# Patient Record
Sex: FEMALE | Race: WHITE | Employment: UNEMPLOYED | URBAN - METROPOLITAN AREA
[De-identification: names, ages, dates, MRNs, and addresses within clinical notes are randomized per-mention and may not be internally consistent; named-entity substitution may affect disease eponyms.]

---

## 2018-12-25 ENCOUNTER — APPOINTMENT (OUTPATIENT)
Dept: CT IMAGING | Age: 73
End: 2018-12-25
Payer: MEDICARE

## 2018-12-25 ENCOUNTER — APPOINTMENT (OUTPATIENT)
Dept: GENERAL RADIOLOGY | Age: 73
End: 2018-12-25
Payer: MEDICARE

## 2018-12-25 ENCOUNTER — HOSPITAL ENCOUNTER (EMERGENCY)
Age: 73
Discharge: HOME | End: 2018-12-25
Payer: MEDICARE

## 2018-12-25 VITALS
TEMPERATURE: 98.2 F | BODY MASS INDEX: 20.16 KG/M2 | HEART RATE: 83 BPM | OXYGEN SATURATION: 94 % | DIASTOLIC BLOOD PRESSURE: 45 MMHG | RESPIRATION RATE: 15 BRPM | HEIGHT: 59 IN | SYSTOLIC BLOOD PRESSURE: 135 MMHG | WEIGHT: 100 LBS

## 2018-12-25 DIAGNOSIS — F03.90 DEMENTIA WITHOUT BEHAVIORAL DISTURBANCE, UNSPECIFIED DEMENTIA TYPE: ICD-10-CM

## 2018-12-25 DIAGNOSIS — E86.0 DEHYDRATION: ICD-10-CM

## 2018-12-25 DIAGNOSIS — N39.0 URINARY TRACT INFECTION WITHOUT HEMATURIA, SITE UNSPECIFIED: Primary | ICD-10-CM

## 2018-12-25 LAB
ALBUMIN SERPL-MCNC: 4 GM/DL (ref 3.4–5)
ALP BLD-CCNC: 106 IU/L (ref 40–129)
ALT SERPL-CCNC: 19 U/L (ref 10–40)
ANION GAP SERPL CALCULATED.3IONS-SCNC: 15 MMOL/L (ref 4–16)
AST SERPL-CCNC: 17 IU/L (ref 15–37)
BACTERIA: ABNORMAL /HPF
BASOPHILS ABSOLUTE: 0.1 K/CU MM
BASOPHILS RELATIVE PERCENT: 0.7 % (ref 0–1)
BILIRUB SERPL-MCNC: 0.4 MG/DL (ref 0–1)
BILIRUBIN URINE: NEGATIVE MG/DL
BLOOD, URINE: NEGATIVE
BUN BLDV-MCNC: 16 MG/DL (ref 6–23)
CALCIUM SERPL-MCNC: 9.2 MG/DL (ref 8.3–10.6)
CHLORIDE BLD-SCNC: 103 MMOL/L (ref 99–110)
CLARITY: ABNORMAL
CO2: 23 MMOL/L (ref 21–32)
COLOR: ABNORMAL
CREAT SERPL-MCNC: 0.9 MG/DL (ref 0.6–1.1)
DIFFERENTIAL TYPE: ABNORMAL
EOSINOPHILS ABSOLUTE: 0.2 K/CU MM
EOSINOPHILS RELATIVE PERCENT: 1.2 % (ref 0–3)
GFR AFRICAN AMERICAN: >60 ML/MIN/1.73M2
GFR NON-AFRICAN AMERICAN: >60 ML/MIN/1.73M2
GLUCOSE BLD-MCNC: 137 MG/DL (ref 70–99)
GLUCOSE, URINE: NEGATIVE MG/DL
HCT VFR BLD CALC: 47.6 % (ref 37–47)
HEMOGLOBIN: 14.7 GM/DL (ref 12.5–16)
IMMATURE NEUTROPHIL %: 0.4 % (ref 0–0.43)
KETONES, URINE: ABNORMAL MG/DL
LACTATE: 1.6 MMOL/L (ref 0.4–2)
LEUKOCYTE ESTERASE, URINE: ABNORMAL
LIPASE: 61 IU/L (ref 13–60)
LYMPHOCYTES ABSOLUTE: 3.1 K/CU MM
LYMPHOCYTES RELATIVE PERCENT: 22.7 % (ref 24–44)
MCH RBC QN AUTO: 28.7 PG (ref 27–31)
MCHC RBC AUTO-ENTMCNC: 30.9 % (ref 32–36)
MCV RBC AUTO: 93 FL (ref 78–100)
MONOCYTES ABSOLUTE: 1.2 K/CU MM
MONOCYTES RELATIVE PERCENT: 8.9 % (ref 0–4)
MUCUS: ABNORMAL HPF
NITRITE URINE, QUANTITATIVE: NEGATIVE
NUCLEATED RBC %: 0 %
PDW BLD-RTO: 13.7 % (ref 11.7–14.9)
PH, URINE: 5 (ref 5–8)
PLATELET # BLD: 393 K/CU MM (ref 140–440)
PMV BLD AUTO: 10 FL (ref 7.5–11.1)
POTASSIUM SERPL-SCNC: 4.6 MMOL/L (ref 3.5–5.1)
PROTEIN UA: 30 MG/DL
RBC # BLD: 5.12 M/CU MM (ref 4.2–5.4)
RBC URINE: 8 /HPF (ref 0–6)
SEGMENTED NEUTROPHILS ABSOLUTE COUNT: 9 K/CU MM
SEGMENTED NEUTROPHILS RELATIVE PERCENT: 66.1 % (ref 36–66)
SODIUM BLD-SCNC: 141 MMOL/L (ref 135–145)
SPECIFIC GRAVITY UA: 1.02 (ref 1–1.03)
SQUAMOUS EPITHELIAL: 8 /HPF
TOTAL IMMATURE NEUTOROPHIL: 0.06 K/CU MM
TOTAL NUCLEATED RBC: 0 K/CU MM
TOTAL PROTEIN: 7.4 GM/DL (ref 6.4–8.2)
TRANSITIONAL EPITHELIAL: <1 /HPF
TRICHOMONAS: ABNORMAL /HPF
TROPONIN T: <0.01 NG/ML
UROBILINOGEN, URINE: NORMAL MG/DL (ref 0.2–1)
WBC # BLD: 13.5 K/CU MM (ref 4–10.5)
WBC UA: 28 /HPF (ref 0–5)

## 2018-12-25 PROCEDURE — 87077 CULTURE AEROBIC IDENTIFY: CPT

## 2018-12-25 PROCEDURE — 87086 URINE CULTURE/COLONY COUNT: CPT

## 2018-12-25 PROCEDURE — 83690 ASSAY OF LIPASE: CPT

## 2018-12-25 PROCEDURE — 70450 CT HEAD/BRAIN W/O DYE: CPT

## 2018-12-25 PROCEDURE — 6360000004 HC RX CONTRAST MEDICATION: Performed by: PHYSICIAN ASSISTANT

## 2018-12-25 PROCEDURE — 85025 COMPLETE CBC W/AUTO DIFF WBC: CPT

## 2018-12-25 PROCEDURE — 71260 CT THORAX DX C+: CPT

## 2018-12-25 PROCEDURE — 2580000003 HC RX 258: Performed by: PHYSICIAN ASSISTANT

## 2018-12-25 PROCEDURE — 93005 ELECTROCARDIOGRAM TRACING: CPT | Performed by: PHYSICIAN ASSISTANT

## 2018-12-25 PROCEDURE — 74177 CT ABD & PELVIS W/CONTRAST: CPT

## 2018-12-25 PROCEDURE — 36415 COLL VENOUS BLD VENIPUNCTURE: CPT

## 2018-12-25 PROCEDURE — 6370000000 HC RX 637 (ALT 250 FOR IP): Performed by: PHYSICIAN ASSISTANT

## 2018-12-25 PROCEDURE — 81001 URINALYSIS AUTO W/SCOPE: CPT

## 2018-12-25 PROCEDURE — 96361 HYDRATE IV INFUSION ADD-ON: CPT

## 2018-12-25 PROCEDURE — 87186 SC STD MICRODIL/AGAR DIL: CPT

## 2018-12-25 PROCEDURE — 96360 HYDRATION IV INFUSION INIT: CPT

## 2018-12-25 PROCEDURE — 99284 EMERGENCY DEPT VISIT MOD MDM: CPT

## 2018-12-25 PROCEDURE — 80053 COMPREHEN METABOLIC PANEL: CPT

## 2018-12-25 PROCEDURE — 84484 ASSAY OF TROPONIN QUANT: CPT

## 2018-12-25 PROCEDURE — 71045 X-RAY EXAM CHEST 1 VIEW: CPT

## 2018-12-25 PROCEDURE — 83605 ASSAY OF LACTIC ACID: CPT

## 2018-12-25 RX ORDER — CEPHALEXIN 500 MG/1
500 CAPSULE ORAL 2 TIMES DAILY
Qty: 14 CAPSULE | Refills: 0 | Status: SHIPPED | OUTPATIENT
Start: 2018-12-25 | End: 2019-01-01

## 2018-12-25 RX ORDER — HYDROXYZINE HYDROCHLORIDE 25 MG/1
12.5 TABLET, FILM COATED ORAL NIGHTLY
COMMUNITY

## 2018-12-25 RX ORDER — ASPIRIN 81 MG/1
81 TABLET, CHEWABLE ORAL DAILY
COMMUNITY

## 2018-12-25 RX ORDER — ATORVASTATIN CALCIUM 10 MG/1
10 TABLET, FILM COATED ORAL NIGHTLY
COMMUNITY

## 2018-12-25 RX ORDER — SODIUM CHLORIDE 9 MG/ML
INJECTION, SOLUTION INTRAVENOUS CONTINUOUS
Status: DISCONTINUED | OUTPATIENT
Start: 2018-12-25 | End: 2018-12-25 | Stop reason: HOSPADM

## 2018-12-25 RX ORDER — CEPHALEXIN 250 MG/1
500 CAPSULE ORAL ONCE
Status: COMPLETED | OUTPATIENT
Start: 2018-12-25 | End: 2018-12-25

## 2018-12-25 RX ORDER — 0.9 % SODIUM CHLORIDE 0.9 %
1000 INTRAVENOUS SOLUTION INTRAVENOUS ONCE
Status: COMPLETED | OUTPATIENT
Start: 2018-12-25 | End: 2018-12-25

## 2018-12-25 RX ORDER — FLUTICASONE PROPIONATE 50 MCG
1 SPRAY, SUSPENSION (ML) NASAL NIGHTLY
COMMUNITY

## 2018-12-25 RX ORDER — QUETIAPINE FUMARATE 25 MG/1
25 TABLET, FILM COATED ORAL 2 TIMES DAILY
COMMUNITY

## 2018-12-25 RX ORDER — ACETAMINOPHEN 325 MG/1
650 TABLET ORAL
COMMUNITY

## 2018-12-25 RX ORDER — SERTRALINE HYDROCHLORIDE 25 MG/1
25 TABLET, FILM COATED ORAL DAILY
COMMUNITY

## 2018-12-25 RX ADMIN — CEPHALEXIN 500 MG: 250 CAPSULE ORAL at 18:51

## 2018-12-25 RX ADMIN — IOPAMIDOL 75 ML: 755 INJECTION, SOLUTION INTRAVENOUS at 15:39

## 2018-12-25 RX ADMIN — SODIUM CHLORIDE 1000 ML: 9 INJECTION, SOLUTION INTRAVENOUS at 15:37

## 2018-12-25 RX ADMIN — SODIUM CHLORIDE: 9 INJECTION, SOLUTION INTRAVENOUS at 17:49

## 2018-12-25 NOTE — ED PROVIDER NOTES
overlying the right heart border which may reflect eventration of the right hemidiaphragm. Further evaluation with PA and lateral chest is recommended. Density overlying the right heart border uncertain etiology. This may reflect eventration of the right hemidiaphragm. PA and lateral chest is recommended for complete assessment. Alternatively unenhanced chest CT could be pursued for further evaluation. The chest is otherwise unremarkable. ED COURSE & MEDICAL DECISION MAKING     Patient presents as above. Emergent etiologies were considered. Patient's vital signs are stable. She is known from nursing home with chief complaint of decreased activity level, lethargy, fatigue and a new onset, not repetitive, fasciculations versus tremor. Daughter is at bedside stating that she has just not been acting herself and her energy level has decreased. Denies recent fall, injuries. Has not been noticed to be sick by nursing home staff. Does have mild to moderate Alzheimer's dementia. Does not usually eat or drink much but they do have noticed that it is decreased than her normal.  On evaluation she appears well, she does grimace to pain on palpation to the right side of the abdomen. No smoking CVA tenderness. Her heart and lung sounds are within normal limits. We will obtain EKG, chest x-ray, cardiac enzymes. We'll also send another basic labs, we'll obtain a CT of her head and abdomen. We will initiate IV fluid for fluid resuscitation. Patient's white blood count that she'll mild leukocytosis of 13.5 with a left shift. There is no evidence of anemia. No thrombocytopenia.,  Has a metabolic panel was within normal limits. Troponin nondetectable. Lipase mildly elevated at 61. Initial chest x-ray did show a potential artifact, recommending CT scan of the chest.  We will scan chest, abdomen and head.   These imaging studies came back acutely negative, there was no acute cardiopulmonary process identified, signs of infection, increased vascular congestion. No other intra-abdominal process identified. Urine did show a mild urinary tract infection, we did send a urine culture, will treat with Keflex. I think the patient may be mildly dehydrated with a urinary tract infection and may call her decrease in activity. Patient did receive a bolus of fluid and then a fluid infusion throughout her emergency department visit. At this time we can discharge her back to her nursing facility, place her on antibiotics, encourage follow-up with primary care provider. Family members are in agreement with this plan. Diagnosis and plan discussed in detail with patient who understands and agrees. Patient agrees to return emergency department if symptoms worsen or any new symptoms develop. Vital signs and nursing notes reviewed during ED course. Clinical  IMPRESSION    1. Urinary tract infection without hematuria, site unspecified    2. Dehydration    3. Dementia without behavioral disturbance, unspecified dementia type      Comment: Please note this report has been produced using speech recognition software and may contain errors related to that system including errors in grammar, punctuation, and spelling, as well as words and phrases that may be inappropriate. If there are any questions or concerns please feel free to contact the dictating provider for clarification.         Iris Busch, PA  12/25/18 [id number]

## 2018-12-25 NOTE — ED NOTES
Bed: ED-26  Expected date:   Expected time:   Means of arrival:   Comments:  Medic 4 Altered mental status     Lonnie Kahn RN  12/25/18 [id number]

## 2018-12-25 NOTE — ED NOTES
Artemus, lactic acid,ammonia,type & screen,venous ph,type & screen and first set of blood cultures were drawn on patient     Second set of blood cultures at patients bedside and will need drawn via IV access      Amanda Aldrich  12/25/18 [id number]

## 2018-12-25 NOTE — ED PROVIDER NOTES
EKG:  Sinus rhythm with a rate of 81. MI interval 144, QRS 70, . No ST elevations or depressions. Normal T waves. Q waves in the septal leads. Impression: Abnormal EKG. No previous EKGs for comparison.       Ronney Holter, MD  12/25/18 7506

## 2018-12-26 PROCEDURE — 93010 ELECTROCARDIOGRAM REPORT: CPT | Performed by: INTERNAL MEDICINE

## 2018-12-28 LAB
CULTURE: NORMAL
EKG ATRIAL RATE: 81 BPM
EKG DIAGNOSIS: NORMAL
EKG P AXIS: 49 DEGREES
EKG P-R INTERVAL: 144 MS
EKG Q-T INTERVAL: 412 MS
EKG QRS DURATION: 70 MS
EKG QTC CALCULATION (BAZETT): 478 MS
EKG R AXIS: -4 DEGREES
EKG T AXIS: 60 DEGREES
EKG VENTRICULAR RATE: 81 BPM
Lab: NORMAL
ORGANISM: NORMAL
REPORT STATUS: NORMAL
SPECIMEN: NORMAL
TOTAL COLONY COUNT: NORMAL

## 2019-02-22 ENCOUNTER — APPOINTMENT (OUTPATIENT)
Dept: MRI IMAGING | Age: 74
End: 2019-02-22
Payer: MEDICARE

## 2019-02-22 ENCOUNTER — APPOINTMENT (OUTPATIENT)
Dept: CT IMAGING | Age: 74
End: 2019-02-22
Payer: MEDICARE

## 2019-02-22 ENCOUNTER — APPOINTMENT (OUTPATIENT)
Dept: GENERAL RADIOLOGY | Age: 74
End: 2019-02-22
Payer: MEDICARE

## 2019-02-22 ENCOUNTER — HOSPITAL ENCOUNTER (OUTPATIENT)
Age: 74
Setting detail: OBSERVATION
Discharge: NURSING, REHAB OR LONG-TERM CARE | End: 2019-02-26
Attending: EMERGENCY MEDICINE | Admitting: HOSPITALIST
Payer: MEDICARE

## 2019-02-22 DIAGNOSIS — E87.20 LACTIC ACIDOSIS: ICD-10-CM

## 2019-02-22 DIAGNOSIS — R40.0 SOMNOLENCE: Primary | ICD-10-CM

## 2019-02-22 PROBLEM — G93.41 ACUTE METABOLIC ENCEPHALOPATHY: Status: ACTIVE | Noted: 2019-02-22

## 2019-02-22 PROBLEM — G30.9 ALZHEIMER DISEASE (HCC): Status: ACTIVE | Noted: 2019-02-22

## 2019-02-22 PROBLEM — F02.80 ALZHEIMER DISEASE (HCC): Status: ACTIVE | Noted: 2019-02-22

## 2019-02-22 LAB
ACETAMINOPHEN LEVEL: <5 UG/ML (ref 15–30)
ALBUMIN SERPL-MCNC: 3.8 GM/DL (ref 3.4–5)
ALP BLD-CCNC: 88 IU/L (ref 40–129)
ALT SERPL-CCNC: 16 U/L (ref 10–40)
AMMONIA: 27 UMOL/L (ref 11–51)
AMPHETAMINES: NEGATIVE
ANION GAP SERPL CALCULATED.3IONS-SCNC: 13 MMOL/L (ref 4–16)
AST SERPL-CCNC: 17 IU/L (ref 15–37)
BACTERIA: NEGATIVE /HPF
BARBITURATE SCREEN URINE: NEGATIVE
BASE EXCESS: 1 (ref 0–2.4)
BASOPHILS ABSOLUTE: 0.1 K/CU MM
BASOPHILS RELATIVE PERCENT: 0.5 % (ref 0–1)
BENZODIAZEPINE SCREEN, URINE: NEGATIVE
BILIRUB SERPL-MCNC: 0.2 MG/DL (ref 0–1)
BILIRUBIN URINE: NEGATIVE MG/DL
BLOOD, URINE: NEGATIVE
BUN BLDV-MCNC: 20 MG/DL (ref 6–23)
CALCIUM SERPL-MCNC: 9 MG/DL (ref 8.3–10.6)
CANNABINOID SCREEN URINE: NEGATIVE
CHLORIDE BLD-SCNC: 104 MMOL/L (ref 99–110)
CLARITY: CLEAR
CO2: 22 MMOL/L (ref 21–32)
COCAINE METABOLITE: NEGATIVE
COLOR: YELLOW
CREAT SERPL-MCNC: 0.6 MG/DL (ref 0.6–1.1)
DIFFERENTIAL TYPE: ABNORMAL
EOSINOPHILS ABSOLUTE: 0 K/CU MM
EOSINOPHILS RELATIVE PERCENT: 0.3 % (ref 0–3)
GFR AFRICAN AMERICAN: >60 ML/MIN/1.73M2
GFR NON-AFRICAN AMERICAN: >60 ML/MIN/1.73M2
GLUCOSE BLD-MCNC: 142 MG/DL (ref 70–99)
GLUCOSE, URINE: NEGATIVE MG/DL
HCO3 VENOUS: 26.2 MMOL/L (ref 19–25)
HCT VFR BLD CALC: 43.3 % (ref 37–47)
HEMOGLOBIN: 13.4 GM/DL (ref 12.5–16)
IMMATURE NEUTROPHIL %: 0.4 % (ref 0–0.43)
KETONES, URINE: ABNORMAL MG/DL
LACTATE: 2 MMOL/L (ref 0.4–2)
LACTATE: 2.5 MMOL/L (ref 0.4–2)
LEUKOCYTE ESTERASE, URINE: NEGATIVE
LYMPHOCYTES ABSOLUTE: 1.6 K/CU MM
LYMPHOCYTES RELATIVE PERCENT: 13.9 % (ref 24–44)
MAGNESIUM: 2.1 MG/DL (ref 1.8–2.4)
MCH RBC QN AUTO: 28.8 PG (ref 27–31)
MCHC RBC AUTO-ENTMCNC: 30.9 % (ref 32–36)
MCV RBC AUTO: 93.1 FL (ref 78–100)
MONOCYTES ABSOLUTE: 0.7 K/CU MM
MONOCYTES RELATIVE PERCENT: 5.6 % (ref 0–4)
MUCUS: ABNORMAL HPF
NITRITE URINE, QUANTITATIVE: NEGATIVE
NUCLEATED RBC %: 0 %
O2 SAT, VEN: 66.4 % (ref 50–70)
OPIATES, URINE: NEGATIVE
OXYCODONE: NEGATIVE
PCO2, VEN: 52 MMHG (ref 38–52)
PDW BLD-RTO: 13.9 % (ref 11.7–14.9)
PH VENOUS: 7.31 (ref 7.32–7.42)
PH, URINE: 5 (ref 5–8)
PHENCYCLIDINE, URINE: NEGATIVE
PLATELET # BLD: 352 K/CU MM (ref 140–440)
PMV BLD AUTO: 9.9 FL (ref 7.5–11.1)
PO2, VEN: 39 MMHG (ref 28–48)
POTASSIUM SERPL-SCNC: 5 MMOL/L (ref 3.5–5.1)
PRO-BNP: 58.71 PG/ML
PROTEIN UA: NEGATIVE MG/DL
RBC # BLD: 4.65 M/CU MM (ref 4.2–5.4)
RBC URINE: 1 /HPF (ref 0–6)
SALICYLATE LEVEL: <0.3 MG/DL (ref 15–30)
SEGMENTED NEUTROPHILS ABSOLUTE COUNT: 9.3 K/CU MM
SEGMENTED NEUTROPHILS RELATIVE PERCENT: 79.3 % (ref 36–66)
SODIUM BLD-SCNC: 139 MMOL/L (ref 135–145)
SPECIFIC GRAVITY UA: 1.03 (ref 1–1.03)
SQUAMOUS EPITHELIAL: 1 /HPF
TOTAL IMMATURE NEUTOROPHIL: 0.05 K/CU MM
TOTAL NUCLEATED RBC: 0 K/CU MM
TOTAL PROTEIN: 6.9 GM/DL (ref 6.4–8.2)
TRICHOMONAS: ABNORMAL /HPF
TROPONIN T: <0.01 NG/ML
UROBILINOGEN, URINE: NORMAL MG/DL (ref 0.2–1)
WBC # BLD: 11.7 K/CU MM (ref 4–10.5)
WBC UA: 1 /HPF (ref 0–5)

## 2019-02-22 PROCEDURE — 83735 ASSAY OF MAGNESIUM: CPT

## 2019-02-22 PROCEDURE — 96374 THER/PROPH/DIAG INJ IV PUSH: CPT

## 2019-02-22 PROCEDURE — 81001 URINALYSIS AUTO W/SCOPE: CPT

## 2019-02-22 PROCEDURE — 71045 X-RAY EXAM CHEST 1 VIEW: CPT

## 2019-02-22 PROCEDURE — 84484 ASSAY OF TROPONIN QUANT: CPT

## 2019-02-22 PROCEDURE — 80307 DRUG TEST PRSMV CHEM ANLYZR: CPT

## 2019-02-22 PROCEDURE — 6360000002 HC RX W HCPCS: Performed by: HOSPITALIST

## 2019-02-22 PROCEDURE — 70551 MRI BRAIN STEM W/O DYE: CPT

## 2019-02-22 PROCEDURE — 93005 ELECTROCARDIOGRAM TRACING: CPT | Performed by: EMERGENCY MEDICINE

## 2019-02-22 PROCEDURE — 70450 CT HEAD/BRAIN W/O DYE: CPT

## 2019-02-22 PROCEDURE — 82805 BLOOD GASES W/O2 SATURATION: CPT

## 2019-02-22 PROCEDURE — 96361 HYDRATE IV INFUSION ADD-ON: CPT

## 2019-02-22 PROCEDURE — 1200000000 HC SEMI PRIVATE

## 2019-02-22 PROCEDURE — 96372 THER/PROPH/DIAG INJ SC/IM: CPT

## 2019-02-22 PROCEDURE — 82140 ASSAY OF AMMONIA: CPT

## 2019-02-22 PROCEDURE — 83880 ASSAY OF NATRIURETIC PEPTIDE: CPT

## 2019-02-22 PROCEDURE — 93010 ELECTROCARDIOGRAM REPORT: CPT | Performed by: INTERNAL MEDICINE

## 2019-02-22 PROCEDURE — 99285 EMERGENCY DEPT VISIT HI MDM: CPT

## 2019-02-22 PROCEDURE — 83605 ASSAY OF LACTIC ACID: CPT

## 2019-02-22 PROCEDURE — 80053 COMPREHEN METABOLIC PANEL: CPT

## 2019-02-22 PROCEDURE — 36415 COLL VENOUS BLD VENIPUNCTURE: CPT

## 2019-02-22 PROCEDURE — G0480 DRUG TEST DEF 1-7 CLASSES: HCPCS

## 2019-02-22 PROCEDURE — 2580000003 HC RX 258: Performed by: PHYSICIAN ASSISTANT

## 2019-02-22 PROCEDURE — 6360000002 HC RX W HCPCS: Performed by: EMERGENCY MEDICINE

## 2019-02-22 PROCEDURE — 92610 EVALUATE SWALLOWING FUNCTION: CPT

## 2019-02-22 PROCEDURE — 85025 COMPLETE CBC W/AUTO DIFF WBC: CPT

## 2019-02-22 RX ORDER — ACETAMINOPHEN 325 MG/1
650 TABLET ORAL EVERY 6 HOURS PRN
Status: DISCONTINUED | OUTPATIENT
Start: 2019-02-22 | End: 2019-02-26 | Stop reason: HOSPADM

## 2019-02-22 RX ORDER — MEMANTINE HYDROCHLORIDE 10 MG/1
10 TABLET ORAL 2 TIMES DAILY
Status: DISCONTINUED | OUTPATIENT
Start: 2019-02-22 | End: 2019-02-26 | Stop reason: HOSPADM

## 2019-02-22 RX ORDER — ASPIRIN 81 MG/1
81 TABLET, CHEWABLE ORAL DAILY
Status: DISCONTINUED | OUTPATIENT
Start: 2019-02-22 | End: 2019-02-26 | Stop reason: HOSPADM

## 2019-02-22 RX ORDER — ACETAMINOPHEN 650 MG/1
650 SUPPOSITORY RECTAL EVERY 4 HOURS PRN
Status: DISCONTINUED | OUTPATIENT
Start: 2019-02-22 | End: 2019-02-26 | Stop reason: HOSPADM

## 2019-02-22 RX ORDER — ATORVASTATIN CALCIUM 10 MG/1
10 TABLET, FILM COATED ORAL NIGHTLY
Status: DISCONTINUED | OUTPATIENT
Start: 2019-02-22 | End: 2019-02-26 | Stop reason: HOSPADM

## 2019-02-22 RX ORDER — FLUTICASONE PROPIONATE 50 MCG
1 SPRAY, SUSPENSION (ML) NASAL NIGHTLY
Status: DISCONTINUED | OUTPATIENT
Start: 2019-02-22 | End: 2019-02-26 | Stop reason: HOSPADM

## 2019-02-22 RX ORDER — DONEPEZIL HYDROCHLORIDE 5 MG/1
5 TABLET, FILM COATED ORAL NIGHTLY
Status: DISCONTINUED | OUTPATIENT
Start: 2019-02-22 | End: 2019-02-24

## 2019-02-22 RX ORDER — NALOXONE HYDROCHLORIDE 0.4 MG/ML
0.4 INJECTION, SOLUTION INTRAMUSCULAR; INTRAVENOUS; SUBCUTANEOUS ONCE
Status: COMPLETED | OUTPATIENT
Start: 2019-02-22 | End: 2019-02-22

## 2019-02-22 RX ORDER — HYDROXYZINE HYDROCHLORIDE 25 MG/1
12.5 TABLET, FILM COATED ORAL NIGHTLY
Status: DISCONTINUED | OUTPATIENT
Start: 2019-02-22 | End: 2019-02-26 | Stop reason: HOSPADM

## 2019-02-22 RX ORDER — AMLODIPINE BESYLATE 5 MG/1
5 TABLET ORAL DAILY
Status: DISCONTINUED | OUTPATIENT
Start: 2019-02-22 | End: 2019-02-23

## 2019-02-22 RX ORDER — HYDRALAZINE HYDROCHLORIDE 20 MG/ML
10 INJECTION INTRAMUSCULAR; INTRAVENOUS EVERY 6 HOURS PRN
Status: DISCONTINUED | OUTPATIENT
Start: 2019-02-22 | End: 2019-02-26 | Stop reason: HOSPADM

## 2019-02-22 RX ORDER — AMOXICILLIN 250 MG
2 CAPSULE ORAL DAILY
COMMUNITY

## 2019-02-22 RX ORDER — SODIUM CHLORIDE 0.9 % (FLUSH) 0.9 %
10 SYRINGE (ML) INJECTION EVERY 12 HOURS SCHEDULED
Status: DISCONTINUED | OUTPATIENT
Start: 2019-02-22 | End: 2019-02-26 | Stop reason: HOSPADM

## 2019-02-22 RX ORDER — SODIUM CHLORIDE 0.9 % (FLUSH) 0.9 %
10 SYRINGE (ML) INJECTION PRN
Status: DISCONTINUED | OUTPATIENT
Start: 2019-02-22 | End: 2019-02-26 | Stop reason: HOSPADM

## 2019-02-22 RX ORDER — SODIUM CHLORIDE 9 MG/ML
1000 INJECTION, SOLUTION INTRAVENOUS CONTINUOUS
Status: DISCONTINUED | OUTPATIENT
Start: 2019-02-22 | End: 2019-02-23

## 2019-02-22 RX ORDER — ONDANSETRON 2 MG/ML
4 INJECTION INTRAMUSCULAR; INTRAVENOUS EVERY 6 HOURS PRN
Status: DISCONTINUED | OUTPATIENT
Start: 2019-02-22 | End: 2019-02-26 | Stop reason: HOSPADM

## 2019-02-22 RX ORDER — BISACODYL 10 MG
10 SUPPOSITORY, RECTAL RECTAL DAILY PRN
Status: DISCONTINUED | OUTPATIENT
Start: 2019-02-22 | End: 2019-02-26 | Stop reason: HOSPADM

## 2019-02-22 RX ORDER — AMOXICILLIN 250 MG
2 CAPSULE ORAL DAILY
Status: DISCONTINUED | OUTPATIENT
Start: 2019-02-22 | End: 2019-02-26 | Stop reason: HOSPADM

## 2019-02-22 RX ADMIN — SODIUM CHLORIDE 1000 ML: 9 INJECTION, SOLUTION INTRAVENOUS at 08:49

## 2019-02-22 RX ADMIN — SODIUM CHLORIDE 1000 ML: 9 INJECTION, SOLUTION INTRAVENOUS at 14:13

## 2019-02-22 RX ADMIN — SODIUM CHLORIDE 1000 ML: 9 INJECTION, SOLUTION INTRAVENOUS at 21:36

## 2019-02-22 RX ADMIN — NALOXONE HYDROCHLORIDE 0.4 MG: 0.4 INJECTION, SOLUTION INTRAMUSCULAR; INTRAVENOUS; SUBCUTANEOUS at 07:25

## 2019-02-22 RX ADMIN — ENOXAPARIN SODIUM 40 MG: 40 INJECTION SUBCUTANEOUS at 14:13

## 2019-02-23 LAB
ADENOVIRUS DETECTION BY PCR: NOT DETECTED
ANION GAP SERPL CALCULATED.3IONS-SCNC: 8 MMOL/L (ref 4–16)
BORDETELLA PERTUSSIS PCR: NOT DETECTED
BUN BLDV-MCNC: 12 MG/DL (ref 6–23)
CALCIUM SERPL-MCNC: 8.7 MG/DL (ref 8.3–10.6)
CHLAMYDOPHILA PNEUMONIA PCR: NOT DETECTED
CHLORIDE BLD-SCNC: 111 MMOL/L (ref 99–110)
CO2: 25 MMOL/L (ref 21–32)
CORONAVIRUS 229E PCR: NOT DETECTED
CORONAVIRUS HKU1 PCR: NOT DETECTED
CORONAVIRUS NL63 PCR: NOT DETECTED
CORONAVIRUS OC43 PCR: NOT DETECTED
CREAT SERPL-MCNC: 0.8 MG/DL (ref 0.6–1.1)
FOLATE: 8.3 NG/ML (ref 3.1–17.5)
GFR AFRICAN AMERICAN: >60 ML/MIN/1.73M2
GFR NON-AFRICAN AMERICAN: >60 ML/MIN/1.73M2
GLUCOSE BLD-MCNC: 108 MG/DL (ref 70–99)
HCT VFR BLD CALC: 41.2 % (ref 37–47)
HEMOGLOBIN: 12.4 GM/DL (ref 12.5–16)
HUMAN METAPNEUMOVIRUS PCR: NOT DETECTED
INFLUENZA A BY PCR: NOT DETECTED
INFLUENZA A H1 (2009) PCR: NOT DETECTED
INFLUENZA A H1 PANDEMIC PCR: NOT DETECTED
INFLUENZA A H3 PCR: NOT DETECTED
INFLUENZA B BY PCR: NOT DETECTED
MCH RBC QN AUTO: 28.6 PG (ref 27–31)
MCHC RBC AUTO-ENTMCNC: 30.1 % (ref 32–36)
MCV RBC AUTO: 95.2 FL (ref 78–100)
MYCOPLASMA PNEUMONIAE PCR: NOT DETECTED
PARAINFLUENZA 1 PCR: NOT DETECTED
PARAINFLUENZA 2 PCR: NOT DETECTED
PARAINFLUENZA 3 PCR: NOT DETECTED
PARAINFLUENZA 4 PCR: NOT DETECTED
PDW BLD-RTO: 14 % (ref 11.7–14.9)
PLATELET # BLD: 341 K/CU MM (ref 140–440)
PMV BLD AUTO: 9.9 FL (ref 7.5–11.1)
POTASSIUM SERPL-SCNC: 4.6 MMOL/L (ref 3.5–5.1)
RBC # BLD: 4.33 M/CU MM (ref 4.2–5.4)
RHINOVIRUS ENTEROVIRUS PCR: NOT DETECTED
RSV PCR: NOT DETECTED
SODIUM BLD-SCNC: 144 MMOL/L (ref 135–145)
TSH HIGH SENSITIVITY: 1.71 UIU/ML (ref 0.27–4.2)
VITAMIN B-12: 503.1 PG/ML (ref 211–911)
WBC # BLD: 9.8 K/CU MM (ref 4–10.5)

## 2019-02-23 PROCEDURE — 1200000000 HC SEMI PRIVATE

## 2019-02-23 PROCEDURE — 6360000002 HC RX W HCPCS: Performed by: NURSE PRACTITIONER

## 2019-02-23 PROCEDURE — 6360000002 HC RX W HCPCS: Performed by: HOSPITALIST

## 2019-02-23 PROCEDURE — 6370000000 HC RX 637 (ALT 250 FOR IP): Performed by: HOSPITALIST

## 2019-02-23 PROCEDURE — 2580000003 HC RX 258: Performed by: HOSPITALIST

## 2019-02-23 PROCEDURE — 2580000003 HC RX 258: Performed by: PHYSICIAN ASSISTANT

## 2019-02-23 PROCEDURE — 82607 VITAMIN B-12: CPT

## 2019-02-23 PROCEDURE — 84443 ASSAY THYROID STIM HORMONE: CPT

## 2019-02-23 PROCEDURE — 96372 THER/PROPH/DIAG INJ SC/IM: CPT

## 2019-02-23 PROCEDURE — 87798 DETECT AGENT NOS DNA AMP: CPT

## 2019-02-23 PROCEDURE — 87581 M.PNEUMON DNA AMP PROBE: CPT

## 2019-02-23 PROCEDURE — 80048 BASIC METABOLIC PNL TOTAL CA: CPT

## 2019-02-23 PROCEDURE — 6370000000 HC RX 637 (ALT 250 FOR IP): Performed by: NURSE PRACTITIONER

## 2019-02-23 PROCEDURE — 85027 COMPLETE CBC AUTOMATED: CPT

## 2019-02-23 PROCEDURE — 82746 ASSAY OF FOLIC ACID SERUM: CPT

## 2019-02-23 PROCEDURE — 96375 TX/PRO/DX INJ NEW DRUG ADDON: CPT

## 2019-02-23 PROCEDURE — 6370000000 HC RX 637 (ALT 250 FOR IP): Performed by: PSYCHIATRY & NEUROLOGY

## 2019-02-23 PROCEDURE — 87486 CHLMYD PNEUM DNA AMP PROBE: CPT

## 2019-02-23 PROCEDURE — 96361 HYDRATE IV INFUSION ADD-ON: CPT

## 2019-02-23 PROCEDURE — 36415 COLL VENOUS BLD VENIPUNCTURE: CPT

## 2019-02-23 RX ORDER — ACETAMINOPHEN 80 MG
TABLET,CHEWABLE ORAL
Status: COMPLETED
Start: 2019-02-23 | End: 2019-02-24

## 2019-02-23 RX ORDER — AMLODIPINE BESYLATE 10 MG/1
10 TABLET ORAL DAILY
Status: DISCONTINUED | OUTPATIENT
Start: 2019-02-23 | End: 2019-02-26 | Stop reason: HOSPADM

## 2019-02-23 RX ADMIN — AMLODIPINE BESYLATE 10 MG: 10 TABLET ORAL at 11:14

## 2019-02-23 RX ADMIN — HYDRALAZINE HYDROCHLORIDE 10 MG: 20 INJECTION INTRAMUSCULAR; INTRAVENOUS at 04:49

## 2019-02-23 RX ADMIN — DONEPEZIL HYDROCHLORIDE 5 MG: 5 TABLET, FILM COATED ORAL at 20:11

## 2019-02-23 RX ADMIN — HYDROXYZINE HYDROCHLORIDE 12.5 MG: 25 TABLET, FILM COATED ORAL at 20:10

## 2019-02-23 RX ADMIN — SODIUM CHLORIDE, PRESERVATIVE FREE 10 ML: 5 INJECTION INTRAVENOUS at 11:52

## 2019-02-23 RX ADMIN — ASPIRIN 81 MG 81 MG: 81 TABLET ORAL at 11:15

## 2019-02-23 RX ADMIN — ATORVASTATIN CALCIUM 10 MG: 10 TABLET, FILM COATED ORAL at 20:11

## 2019-02-23 RX ADMIN — SODIUM CHLORIDE 1000 ML: 9 INJECTION, SOLUTION INTRAVENOUS at 04:49

## 2019-02-23 RX ADMIN — FLUTICASONE PROPIONATE 1 SPRAY: 50 SPRAY, METERED NASAL at 20:11

## 2019-02-23 RX ADMIN — SERTRALINE HYDROCHLORIDE 25 MG: 50 TABLET ORAL at 11:15

## 2019-02-23 RX ADMIN — MEMANTINE 10 MG: 10 TABLET ORAL at 20:11

## 2019-02-23 RX ADMIN — DOCUSATE SODIUM - SENNOSIDES 2 TABLET: 50; 8.6 TABLET, FILM COATED ORAL at 11:14

## 2019-02-23 RX ADMIN — ENOXAPARIN SODIUM 40 MG: 40 INJECTION SUBCUTANEOUS at 11:15

## 2019-02-23 RX ADMIN — MEMANTINE 10 MG: 10 TABLET ORAL at 11:13

## 2019-02-24 PROCEDURE — 6370000000 HC RX 637 (ALT 250 FOR IP): Performed by: PSYCHIATRY & NEUROLOGY

## 2019-02-24 PROCEDURE — 6370000000 HC RX 637 (ALT 250 FOR IP): Performed by: NURSE PRACTITIONER

## 2019-02-24 PROCEDURE — G0378 HOSPITAL OBSERVATION PER HR: HCPCS

## 2019-02-24 PROCEDURE — 94761 N-INVAS EAR/PLS OXIMETRY MLT: CPT

## 2019-02-24 PROCEDURE — 6370000000 HC RX 637 (ALT 250 FOR IP): Performed by: HOSPITALIST

## 2019-02-24 PROCEDURE — 96372 THER/PROPH/DIAG INJ SC/IM: CPT

## 2019-02-24 PROCEDURE — 6360000002 HC RX W HCPCS: Performed by: HOSPITALIST

## 2019-02-24 RX ORDER — DONEPEZIL HYDROCHLORIDE 10 MG/1
10 TABLET, FILM COATED ORAL NIGHTLY
Status: DISCONTINUED | OUTPATIENT
Start: 2019-02-24 | End: 2019-02-25

## 2019-02-24 RX ADMIN — ASPIRIN 81 MG 81 MG: 81 TABLET ORAL at 08:31

## 2019-02-24 RX ADMIN — ATORVASTATIN CALCIUM 10 MG: 10 TABLET, FILM COATED ORAL at 21:15

## 2019-02-24 RX ADMIN — FLUTICASONE PROPIONATE 1 SPRAY: 50 SPRAY, METERED NASAL at 21:16

## 2019-02-24 RX ADMIN — DONEPEZIL HYDROCHLORIDE 10 MG: 10 TABLET, FILM COATED ORAL at 21:15

## 2019-02-24 RX ADMIN — MEMANTINE 10 MG: 10 TABLET ORAL at 08:31

## 2019-02-24 RX ADMIN — SERTRALINE HYDROCHLORIDE 25 MG: 50 TABLET ORAL at 08:31

## 2019-02-24 RX ADMIN — ENOXAPARIN SODIUM 40 MG: 40 INJECTION SUBCUTANEOUS at 08:31

## 2019-02-24 RX ADMIN — AMLODIPINE BESYLATE 10 MG: 10 TABLET ORAL at 08:31

## 2019-02-24 RX ADMIN — HYDROXYZINE HYDROCHLORIDE 12.5 MG: 25 TABLET, FILM COATED ORAL at 21:15

## 2019-02-24 RX ADMIN — MEMANTINE 10 MG: 10 TABLET ORAL at 21:15

## 2019-02-24 RX ADMIN — Medication: at 00:06

## 2019-02-24 ASSESSMENT — PAIN SCALES - WONG BAKER

## 2019-02-24 ASSESSMENT — PAIN - FUNCTIONAL ASSESSMENT: PAIN_FUNCTIONAL_ASSESSMENT: FACES

## 2019-02-25 PROCEDURE — 6370000000 HC RX 637 (ALT 250 FOR IP): Performed by: HOSPITALIST

## 2019-02-25 PROCEDURE — 96372 THER/PROPH/DIAG INJ SC/IM: CPT

## 2019-02-25 PROCEDURE — 2580000003 HC RX 258: Performed by: HOSPITALIST

## 2019-02-25 PROCEDURE — 6370000000 HC RX 637 (ALT 250 FOR IP): Performed by: PSYCHIATRY & NEUROLOGY

## 2019-02-25 PROCEDURE — G0378 HOSPITAL OBSERVATION PER HR: HCPCS

## 2019-02-25 PROCEDURE — 6370000000 HC RX 637 (ALT 250 FOR IP): Performed by: NURSE PRACTITIONER

## 2019-02-25 PROCEDURE — 6360000002 HC RX W HCPCS: Performed by: HOSPITALIST

## 2019-02-25 RX ADMIN — FLUTICASONE PROPIONATE 1 SPRAY: 50 SPRAY, METERED NASAL at 21:42

## 2019-02-25 RX ADMIN — ATORVASTATIN CALCIUM 10 MG: 10 TABLET, FILM COATED ORAL at 21:42

## 2019-02-25 RX ADMIN — DOCUSATE SODIUM - SENNOSIDES 2 TABLET: 50; 8.6 TABLET, FILM COATED ORAL at 11:31

## 2019-02-25 RX ADMIN — ENOXAPARIN SODIUM 40 MG: 40 INJECTION SUBCUTANEOUS at 11:21

## 2019-02-25 RX ADMIN — ASPIRIN 81 MG 81 MG: 81 TABLET ORAL at 11:22

## 2019-02-25 RX ADMIN — HYDROXYZINE HYDROCHLORIDE 12.5 MG: 25 TABLET, FILM COATED ORAL at 21:42

## 2019-02-25 RX ADMIN — DONEPEZIL HYDROCHLORIDE 15 MG: 10 TABLET, FILM COATED ORAL at 21:41

## 2019-02-25 RX ADMIN — MEMANTINE 10 MG: 10 TABLET ORAL at 11:21

## 2019-02-25 RX ADMIN — SERTRALINE HYDROCHLORIDE 25 MG: 50 TABLET ORAL at 11:22

## 2019-02-25 RX ADMIN — SODIUM CHLORIDE, PRESERVATIVE FREE 10 ML: 5 INJECTION INTRAVENOUS at 11:21

## 2019-02-25 RX ADMIN — AMLODIPINE BESYLATE 10 MG: 10 TABLET ORAL at 11:21

## 2019-02-25 RX ADMIN — MEMANTINE 10 MG: 10 TABLET ORAL at 21:42

## 2019-02-25 ASSESSMENT — PAIN SCALES - WONG BAKER

## 2019-02-26 VITALS
WEIGHT: 132.31 LBS | HEART RATE: 81 BPM | RESPIRATION RATE: 15 BRPM | SYSTOLIC BLOOD PRESSURE: 116 MMHG | HEIGHT: 55 IN | TEMPERATURE: 95.5 F | BODY MASS INDEX: 30.62 KG/M2 | OXYGEN SATURATION: 95 % | DIASTOLIC BLOOD PRESSURE: 74 MMHG

## 2019-02-26 PROCEDURE — 6370000000 HC RX 637 (ALT 250 FOR IP): Performed by: HOSPITALIST

## 2019-02-26 PROCEDURE — G0378 HOSPITAL OBSERVATION PER HR: HCPCS

## 2019-02-26 PROCEDURE — 97530 THERAPEUTIC ACTIVITIES: CPT

## 2019-02-26 PROCEDURE — 6360000002 HC RX W HCPCS: Performed by: HOSPITALIST

## 2019-02-26 PROCEDURE — 94761 N-INVAS EAR/PLS OXIMETRY MLT: CPT

## 2019-02-26 PROCEDURE — 97166 OT EVAL MOD COMPLEX 45 MIN: CPT

## 2019-02-26 PROCEDURE — 6370000000 HC RX 637 (ALT 250 FOR IP): Performed by: NURSE PRACTITIONER

## 2019-02-26 RX ADMIN — ENOXAPARIN SODIUM 40 MG: 40 INJECTION SUBCUTANEOUS at 10:58

## 2019-02-26 RX ADMIN — SERTRALINE HYDROCHLORIDE 25 MG: 50 TABLET ORAL at 10:58

## 2019-02-26 RX ADMIN — MEMANTINE 10 MG: 10 TABLET ORAL at 10:57

## 2019-02-26 RX ADMIN — ASPIRIN 81 MG 81 MG: 81 TABLET ORAL at 10:57

## 2019-02-26 RX ADMIN — AMLODIPINE BESYLATE 10 MG: 10 TABLET ORAL at 10:57

## 2019-02-26 RX ADMIN — DOCUSATE SODIUM - SENNOSIDES 2 TABLET: 50; 8.6 TABLET, FILM COATED ORAL at 10:57

## 2019-02-26 ASSESSMENT — PAIN SCALES - WONG BAKER
WONGBAKER_NUMERICALRESPONSE: 0

## 2019-02-27 LAB
EKG ATRIAL RATE: 85 BPM
EKG DIAGNOSIS: NORMAL
EKG P AXIS: 52 DEGREES
EKG P-R INTERVAL: 158 MS
EKG Q-T INTERVAL: 410 MS
EKG QRS DURATION: 66 MS
EKG QTC CALCULATION (BAZETT): 487 MS
EKG R AXIS: 0 DEGREES
EKG T AXIS: 56 DEGREES
EKG VENTRICULAR RATE: 85 BPM